# Patient Record
Sex: FEMALE
[De-identification: names, ages, dates, MRNs, and addresses within clinical notes are randomized per-mention and may not be internally consistent; named-entity substitution may affect disease eponyms.]

---

## 2017-01-10 ENCOUNTER — APPOINTMENT (OUTPATIENT)
Dept: OPHTHALMOLOGY | Facility: CLINIC | Age: 67
End: 2017-01-10

## 2023-08-08 ENCOUNTER — APPOINTMENT (OUTPATIENT)
Dept: ORTHOPEDIC SURGERY | Facility: CLINIC | Age: 73
End: 2023-08-08
Payer: MEDICARE

## 2023-08-08 VITALS — HEIGHT: 65 IN | BODY MASS INDEX: 31.65 KG/M2 | WEIGHT: 190 LBS

## 2023-08-08 PROCEDURE — 20526 THER INJECTION CARP TUNNEL: CPT | Mod: LT

## 2023-08-08 PROCEDURE — 29126 APPL SHORT ARM SPLINT DYN: CPT | Mod: LT,59

## 2023-08-08 PROCEDURE — 99204 OFFICE O/P NEW MOD 45 MIN: CPT | Mod: 25

## 2023-08-08 RX ORDER — ATORVASTATIN CALCIUM 80 MG/1
TABLET, FILM COATED ORAL
Refills: 0 | Status: ACTIVE | COMMUNITY

## 2023-08-08 RX ORDER — METOPROLOL TARTRATE 75 MG/1
TABLET, FILM COATED ORAL
Refills: 0 | Status: ACTIVE | COMMUNITY

## 2023-08-08 RX ORDER — AMLODIPINE BESYLATE 5 MG/1
TABLET ORAL
Refills: 0 | Status: ACTIVE | COMMUNITY

## 2023-08-08 RX ORDER — LATANOPROST/PF 0.005 %
DROPS OPHTHALMIC (EYE)
Refills: 0 | Status: ACTIVE | COMMUNITY

## 2023-08-08 RX ORDER — OLMESARTAN MEDOXOMIL 40 MG/1
TABLET, FILM COATED ORAL
Refills: 0 | Status: ACTIVE | COMMUNITY

## 2023-08-08 NOTE — HISTORY OF PRESENT ILLNESS
[8] : 8 [Localized] : localized [Throbbing] : throbbing [Tingling] : tingling [Constant] : constant [Retired] : Work status: retired [de-identified] : 8/8/2023: rhd 74 yo female here with left wrist pain x 4 weeks.  Pt with tingling / numbness that is intermittent. Pt denies associated weakness. Pt states pain wakes her from sleep. There is no hx of trauma.   Allergies: Codeine, zocor, Iodine.  [] : Post Surgical Visit: no [FreeTextEntry1] : lt hand [FreeTextEntry3] : 8/7/23 [FreeTextEntry5] : Patient is here today for ongoing lt wrist pain onset 3 weeks ago. Went to [facility] 8/7/23 and got xrays/brace. Has tried OTC meds with no relief. [FreeTextEntry6] : numbness [de-identified] : motion [de-identified] : brace

## 2023-08-08 NOTE — DATA REVIEWED
[Outside X-rays] : outside x-rays [Left] : left [Hand] : hand [I independently reviewed and interpreted images and report] : I independently reviewed and interpreted images and report [FreeTextEntry1] : Left hand with SL opening and severe 1st [facility] oa

## 2023-08-08 NOTE — IMAGING
[de-identified] : left hand with no atrophy. + Tinel over the left carpal tunnel only. Negative Spurling Signs noted. Strength is 5/5. Diminished sensation over the left median nerve distribution.

## 2023-08-08 NOTE — ASSESSMENT
[FreeTextEntry1] : The patient was advised of the diagnosis. The natural history of the pathology was explained in full to the patient in layman's terms. All questions were answered. The risks and benefits of surgical and non-surgical treatment alternatives were explained in full to the patient. The risks, benefits and contents of the injection have been discussed.  Risks include but are not limited to allergic reaction, flare reaction, permanent white skin discoloration at the injection site and infection.  The patient understands the risks and agrees to having the injection.  We also discussed that about 22% of patients have relief at a year but the rest have recurrence if the symptoms.  However, if the injection is successful it is a positive predictor of benefit of surgery.  Success of the injection to relieve symptoms is also a great diagnostic test and obviates the need for EMG testing.  The patient verbalized understanding.  All questions have been answered. A sterile prep of the left volar wrist was performed and the carpal tunnel was entered and injected with 1 cc of Lidocaine and 6mg of celestone. The patient tolerated the procedure well without complication. The patient was instructed to ice the area of the injection left ct brace x 3 weeks. RTO in 4 weeks for f/u care.

## 2023-08-29 ENCOUNTER — APPOINTMENT (OUTPATIENT)
Dept: ORTHOPEDIC SURGERY | Facility: CLINIC | Age: 73
End: 2023-08-29
Payer: MEDICARE

## 2023-08-29 VITALS — BODY MASS INDEX: 31.65 KG/M2 | WEIGHT: 190 LBS | HEIGHT: 65 IN

## 2023-08-29 DIAGNOSIS — Z86.79 PERSONAL HISTORY OF OTHER DISEASES OF THE CIRCULATORY SYSTEM: ICD-10-CM

## 2023-08-29 DIAGNOSIS — Z86.39 PERSONAL HISTORY OF OTHER ENDOCRINE, NUTRITIONAL AND METABOLIC DISEASE: ICD-10-CM

## 2023-08-29 PROCEDURE — 99203 OFFICE O/P NEW LOW 30 MIN: CPT

## 2023-08-29 RX ORDER — METHYLPREDNISOLONE 4 MG/1
4 TABLET ORAL
Qty: 1 | Refills: 0 | Status: ACTIVE | COMMUNITY
Start: 2023-08-29 | End: 1900-01-01

## 2023-08-29 NOTE — ASSESSMENT
[FreeTextEntry1] : MDP sent for pain and inflammation Continue with wrist brace Ice / elevation Follow up with Dr. Brenner as scheduled next week

## 2023-08-29 NOTE — PHYSICAL EXAM
[Left] : left hand [Dorsal Wrist] : dorsal wrist [5___] : volarflexion 5[unfilled]/5 [] : light touch intact throughout [TWNoteComboBox7] : dorsiflexion 40 degrees [TWNoteComboBox4] : volarflexion 50 degrees

## 2023-08-29 NOTE — HISTORY OF PRESENT ILLNESS
[4] : 4 [Dull/Aching] : dull/aching [Localized] : localized [Constant] : constant [Meds] : meds [Retired] : Work status: retired [de-identified] : 8/29/23: Here to f/u on left wrist. Had injection with Dr. Brenner which helped for 2 weeks, but pain returned. Taking ibuprofen 600 with temporary relief. No previous injuries or surgeries to left wrist.  [] : Post Surgical Visit: no [FreeTextEntry5] : Patient complains of wrist pain since 1 week ago, was treated for this back on 8/8/23, pain subsided but came back last week, meds are helping, cardiologist told her not not to take motrin. [FreeTextEntry1] : Lt wrist [de-identified] : Dr. Brenner

## 2023-09-07 ENCOUNTER — APPOINTMENT (OUTPATIENT)
Dept: ORTHOPEDIC SURGERY | Facility: CLINIC | Age: 73
End: 2023-09-07
Payer: MEDICARE

## 2023-09-07 PROCEDURE — 99213 OFFICE O/P EST LOW 20 MIN: CPT

## 2023-09-07 NOTE — HISTORY OF PRESENT ILLNESS
[de-identified] : 9/7/23: MDP helped, pain intermittent and tolerable  8/29/23- injection wore off after 2 weeks and she went to , given MDP  8/8/2023: rhd 72 yo female here with left wrist pain x 4 weeks.  Pt with tingling / numbness that is intermittent. Pt denies associated weakness. Pt states pain wakes her from sleep. There is no hx of trauma.   Allergies: Codeine, zocor, Iodine.  [8] : 8 [Localized] : localized [Throbbing] : throbbing [Tingling] : tingling [Constant] : constant [Retired] : Work status: retired [] : Post Surgical Visit: no [FreeTextEntry1] : lt hand [FreeTextEntry3] : 8/7/23 [FreeTextEntry5] : Patient is here today for ongoing lt wrist pain onset 3 weeks ago. Went to [facility] 8/7/23 and got xrays/brace. Has tried OTC meds with no relief. [FreeTextEntry6] : numbness [de-identified] : motion [de-identified] : brace

## 2023-09-07 NOTE — IMAGING
[de-identified] : left hand with no atrophy. + Tinel over the left carpal tunnel only. Negative Spurling Signs noted. Strength is 5/5. Diminished sensation over the left median nerve distribution.

## 2023-09-07 NOTE — ASSESSMENT
[FreeTextEntry1] : The patient was advised of the diagnosis. The natural history of the pathology was explained in full to the patient in layman's terms. All questions were answered. The risks and benefits of surgical and non-surgical treatment alternatives were explained in full to the patient.  MRI eval for flexor tenosynovitis F/u after MRI

## 2023-09-13 ENCOUNTER — APPOINTMENT (OUTPATIENT)
Dept: MRI IMAGING | Facility: CLINIC | Age: 73
End: 2023-09-13
Payer: MEDICARE

## 2023-09-13 PROCEDURE — 73221 MRI JOINT UPR EXTREM W/O DYE: CPT | Mod: LT

## 2023-09-19 ENCOUNTER — APPOINTMENT (OUTPATIENT)
Dept: ORTHOPEDIC SURGERY | Facility: CLINIC | Age: 73
End: 2023-09-19
Payer: MEDICARE

## 2023-09-19 DIAGNOSIS — G56.02 CARPAL TUNNEL SYNDROME, LEFT UPPER LIMB: ICD-10-CM

## 2023-09-19 DIAGNOSIS — M18.12 UNILATERAL PRIMARY OSTEOARTHRITIS OF FIRST CARPOMETACARPAL JOINT, LEFT HAND: ICD-10-CM

## 2023-09-19 DIAGNOSIS — M25.332 OTHER INSTABILITY, LEFT WRIST: ICD-10-CM

## 2023-09-19 DIAGNOSIS — M65.9 SYNOVITIS AND TENOSYNOVITIS, UNSPECIFIED: ICD-10-CM

## 2023-09-19 PROCEDURE — 99214 OFFICE O/P EST MOD 30 MIN: CPT | Mod: 25

## 2023-09-19 PROCEDURE — 20605 DRAIN/INJ JOINT/BURSA W/O US: CPT | Mod: LT

## 2023-09-19 PROCEDURE — L3809: CPT | Mod: LT

## 2023-09-19 PROCEDURE — 73130 X-RAY EXAM OF HAND: CPT | Mod: RT

## 2023-10-19 ENCOUNTER — APPOINTMENT (OUTPATIENT)
Dept: ORTHOPEDIC SURGERY | Facility: CLINIC | Age: 73
End: 2023-10-19

## 2025-04-28 ENCOUNTER — APPOINTMENT (OUTPATIENT)
Dept: ORTHOPEDIC SURGERY | Facility: CLINIC | Age: 75
End: 2025-04-28
Payer: MEDICARE

## 2025-04-28 VITALS — BODY MASS INDEX: 31.65 KG/M2 | HEIGHT: 65 IN | WEIGHT: 190 LBS

## 2025-04-28 DIAGNOSIS — E11.9 TYPE 2 DIABETES MELLITUS W/OUT COMPLICATIONS: ICD-10-CM

## 2025-04-28 DIAGNOSIS — M17.12 UNILATERAL PRIMARY OSTEOARTHRITIS, LEFT KNEE: ICD-10-CM

## 2025-04-28 DIAGNOSIS — S83.422A SPRAIN OF LATERAL COLLATERAL LIGAMENT OF LEFT KNEE, INITIAL ENCOUNTER: ICD-10-CM

## 2025-04-28 DIAGNOSIS — M23.92 UNSPECIFIED INTERNAL DERANGEMENT OF LEFT KNEE: ICD-10-CM

## 2025-04-28 PROCEDURE — 73562 X-RAY EXAM OF KNEE 3: CPT | Mod: LT

## 2025-04-28 PROCEDURE — 99214 OFFICE O/P EST MOD 30 MIN: CPT

## 2025-04-28 PROCEDURE — L1833: CPT | Mod: LT

## 2025-04-28 RX ORDER — DICLOFENAC SODIUM 50 MG/1
50 TABLET, DELAYED RELEASE ORAL
Qty: 60 | Refills: 0 | Status: ACTIVE | COMMUNITY
Start: 2025-04-28 | End: 1900-01-01

## 2025-05-15 ENCOUNTER — APPOINTMENT (OUTPATIENT)
Dept: ORTHOPEDIC SURGERY | Facility: CLINIC | Age: 75
End: 2025-05-15